# Patient Record
Sex: MALE
[De-identification: names, ages, dates, MRNs, and addresses within clinical notes are randomized per-mention and may not be internally consistent; named-entity substitution may affect disease eponyms.]

---

## 2018-07-09 ENCOUNTER — HOSPITAL ENCOUNTER (EMERGENCY)
Dept: HOSPITAL 14 - H.ER | Age: 38
Discharge: HOME | End: 2018-07-09
Payer: COMMERCIAL

## 2018-07-09 VITALS
OXYGEN SATURATION: 97 % | HEART RATE: 83 BPM | RESPIRATION RATE: 16 BRPM | DIASTOLIC BLOOD PRESSURE: 62 MMHG | SYSTOLIC BLOOD PRESSURE: 124 MMHG

## 2018-07-09 VITALS — TEMPERATURE: 99.1 F

## 2018-07-09 DIAGNOSIS — F17.200: ICD-10-CM

## 2018-07-09 DIAGNOSIS — N45.3: ICD-10-CM

## 2018-07-09 DIAGNOSIS — R31.9: Primary | ICD-10-CM

## 2018-07-09 LAB
BACTERIA #/AREA URNS HPF: (no result) /[HPF]
BASOPHILS # BLD AUTO: 0.1 K/UL (ref 0–0.2)
BASOPHILS NFR BLD: 0.5 % (ref 0–2)
BILIRUB UR-MCNC: NEGATIVE MG/DL
BUN SERPL-MCNC: 13 MG/DL (ref 9–20)
CALCIUM SERPL-MCNC: 9.4 MG/DL (ref 8.4–10.2)
COLOR UR: YELLOW
EOSINOPHIL # BLD AUTO: 0.2 K/UL (ref 0–0.7)
EOSINOPHIL NFR BLD: 1.8 % (ref 0–4)
ERYTHROCYTE [DISTWIDTH] IN BLOOD BY AUTOMATED COUNT: 13 % (ref 11.5–14.5)
GFR NON-AFRICAN AMERICAN: > 60
GLUCOSE UR STRIP-MCNC: (no result) MG/DL
HGB BLD-MCNC: 14.7 G/DL (ref 12–18)
LEUKOCYTE ESTERASE UR-ACNC: (no result) LEU/UL
LYMPHOCYTES # BLD AUTO: 2.8 K/UL (ref 1–4.3)
LYMPHOCYTES NFR BLD AUTO: 22.9 % (ref 20–40)
MCH RBC QN AUTO: 30.1 PG (ref 27–31)
MCHC RBC AUTO-ENTMCNC: 34.3 G/DL (ref 33–37)
MCV RBC AUTO: 87.8 FL (ref 80–94)
MONOCYTES # BLD: 1.4 K/UL (ref 0–0.8)
MONOCYTES NFR BLD: 11.4 % (ref 0–10)
NEUTROPHILS # BLD: 7.7 K/UL (ref 1.8–7)
NEUTROPHILS NFR BLD AUTO: 63.4 % (ref 50–75)
NRBC BLD AUTO-RTO: 0 % (ref 0–0)
PH UR STRIP: 5 [PH] (ref 5–8)
PLATELET # BLD: 261 K/UL (ref 130–400)
PMV BLD AUTO: 8 FL (ref 7.2–11.7)
PROT UR STRIP-MCNC: 30 MG/DL
RBC # BLD AUTO: 4.88 MIL/UL (ref 4.4–5.9)
RBC # UR STRIP: (no result) /UL
SP GR UR STRIP: 1.02 (ref 1–1.03)
SQUAMOUS EPITHIAL: < 1 /HPF (ref 0–5)
URINE CLARITY: (no result)
UROBILINOGEN UR-MCNC: (no result) MG/DL (ref 0.2–1)
WBC # BLD AUTO: 12.1 K/UL (ref 4.8–10.8)

## 2018-07-09 NOTE — ED PDOC
HPI: Male  Pain


Time Seen by Provider: 07/09/18 19:10


Chief Complaint (Nursing): Male Genitourinary


Chief Complaint (Provider): Groin and back pain


History Per: Patient


History/Exam Limitations: no limitations


Onset/Duration Of Symptoms: Gradual


Current Symptoms Are (Timing): Still Present


Quality Of Discomfort: "Pain"


Associated Symptoms: Back Pain, Urinary Symptoms


Additional History Per: Patient


Additional Complaint(s): 


39yo male with no past medical history, presents to ED stating for the past 3 

days, he has had gradually developing left sided groin and back pain. He 

noticed blood in his urine and states the pain progressively worsened in his 

left testicle. At present, patient states the back pain has resolved and he has 

clear urine. He does report a tactile fever and states he felt very warn. 

Patient reports he is sexually active with his wife. He denies any abdominal 

pain, vomiting, and offers no other medical complaints.





PMD: None


Medications: None





Past Medical History


Reviewed: Historical Data, Nursing Documentation, Vital Signs


Vital Signs: 





 Last Vital Signs











Temp  101.3 F H  07/09/18 16:40


 


Pulse  94 H  07/09/18 16:40


 


Resp  18   07/09/18 16:40


 


BP  132/80   07/09/18 16:40


 


Pulse Ox  99   07/09/18 16:40














- Medical History


PMH: No Chronic Diseases


   Denies: Chronic Kidney Disease





- Surgical History


Surgical History: No Surg Hx





- Family History


Family History: States: No Known Family Hx





- Social History


Current smoker - smoking cessation education provided: Yes (occasional)


Alcohol: Occasional


Drugs: Denies





- Home Medications


Home Medications: 


 Ambulatory Orders











 Medication  Instructions  Recorded


 


Ibuprofen [Motrin Tab] 600 mg PO Q6 #30 tab 07/09/18


 


Levofloxacin [Levaquin] 500 mg PO DAILY #10 tablet 07/09/18














- Allergies


Allergies/Adverse Reactions: 


 Allergies











Allergy/AdvReac Type Severity Reaction Status Date / Time


 


No Known Allergies Allergy   Verified 07/09/18 16:39














Review of Systems


ROS Statement: Except As Marked, All Systems Reviewed And Found Negative


Constitutional: Positive for: Fever (tactile)


Gastrointestinal: Negative for: Vomiting, Abdominal Pain


Genitourinary Male: Positive for: Scrotal Pain (left sided), Other (left sided 

groin pain)


Musculoskeletal: Positive for: Back Pain (now resolved)





Physical Exam





- Reviewed


Nursing Documentation Reviewed: Yes


Vital Signs Reviewed: Yes





- Physical Exam


Appears: Positive for: Non-toxic, No Acute Distress


Head Exam: Positive for: ATRAUMATIC, NORMAL INSPECTION, NORMOCEPHALIC


Skin: Positive for: Normal Color


Eye Exam: Positive for: Normal appearance


Neck: Positive for: Normal, Supple


Cardiovascular/Chest: Positive for: Regular Rate, Rhythm


Respiratory: Positive for: Normal Breath Sounds


Gastrointestinal/Abdominal: Positive for: Soft.  Negative for: Tenderness


Male Genital Exam: Positive for: testicular tenderness (R) (mild), testicular 

tenderness (L) (mild), other (cremasteric reflex intact; +scrotal swelling).  

Negative for: urethral discharge


Back: Positive for: Normal Inspection.  Negative for: L CVA Tenderness, R CVA 

Tenderness


Extremity: Positive for: Normal ROM


Neurologic/Psych: Positive for: Alert, Oriented





- Laboratory Results


Result Diagrams: 


 07/09/18 19:58





 07/09/18 19:58





- ECG


O2 Sat by Pulse Oximetry: 99 (RA)


Pulse Ox Interpretation: Normal





Medical Decision Making


Medical Decision Making: 


A/P: 39yo male with no past medical history, presents to ED with testicular pain





-- Patient is very well appearing although he is febrile in the ER.


-- Differential diagnosis: orchitis vs. varicocele vs. hydrocele vs. less 

likely testicular torsion, less likely kidney stone.


-- Will check labs, US and re-eval





Time: 2154


US Testes FINDINGS:


Right testicle: Unremarkable. No mass. Normal color Doppler evaluation, and 

normal arterial and


venous waveforms. No torsion.


Left testicle: The left testicle demonstrates within normal limits echogenicity 

but increased color


Doppler flow compared to the right. Normal arterial and venous waveforms. No 

torsion.


Epididymides: The left epididymal head, body and tail are enlarged and 

heterogeneous with


increased vascularity. The right epididymis is unremarkable.


Scrotum: Small left hydrocele.


IMPRESSION:


Acute left epididymitis with left orchitis versus reactive hyperemia to the 

adjacent inflammation.





10PM


Patient given results, states he's feeling much better.  Advised to followup 

with urology and also with [facility] clinic for primary care.  Return precautions 

given.


Scribe Attestation:


Documented by Yas Coreas, acting as a scribe for Leonel Francisco MD.





Provider Scribe Attestation:


All medical record entries made by the Scribe were at my direction and 

personally dictated by me. I have reviewed the chart and agree that the record 

accurately reflects my personal performance of the history, physical exam, 

medical decision making, and the department course for this patient. I have 

also personally directed, reviewed, and agree with the discharge instructions 

and disposition.








Disposition





- Clinical Impression


Clinical Impression: 


 Epididymo-orchitis








- Patient ED Disposition


Is Patient to be Admitted: No





- Disposition


Referrals: 


Marco López MD [Staff Provider] - 


[facility] [Outside]


Disposition: Routine/Home


Disposition Time: 22:00


Condition: IMPROVED


Prescriptions: 


Ibuprofen [Motrin Tab] 600 mg PO Q6 #30 tab


Levofloxacin [Levaquin] 500 mg PO DAILY #10 tablet


Instructions:  Epididymo-orchitis (ED)


Forms:  [facility] (English)


Print Language: Ma[place]

## 2018-07-10 NOTE — US
Date of service: 



07/09/2018



HISTORY:

Left testicular swelling x 3 days, fever



TECHNIQUE:

Realtime sonography through the scrotum with color and doppler flow.



COMPARISON:

None Available.



FINDINGS:



RIGHT TESTICLE:

Measures 2 x 2.8 x 3.9 cm. Normal echotexture and flow.



RIGHT EPIDIDYMIS:

Epididymal head measures 0.5 x 0.7 x 1.2 cm. Grossly unremarkable 

appearance with normal flow.



LEFT TESTICLE:

Measures 2.2 x 2.4 x 3.6 cm. Normal echotexture and flow.



LEFT EPIDIDYMIS:

Epididymal head measures 1 x 1.2 cm. Grossly unremarkable appearance 

with normal flow.



Epididymal tail: 1.4 x 1.4 x 1.9 cm. Enlarged, edematous and 

hypervascular 



HYDROCELE:

Small, unilateral -left.



VARICOCELE:

None.



OTHER FINDINGS:

None. 



IMPRESSION:

Evidence of left epididymitis. No evidence of torsion or mass lesion.



________________________________________________



Concordant results (preliminary interpretation) provided by [email or web address].



Procedure Completed: 21:18July 9, 2018. 



Preliminary ([facility]) Report: Dictated and Authenticated: 21:54



Final Interpretation: 13:58 July 10, 2018.